# Patient Record
Sex: FEMALE
[De-identification: names, ages, dates, MRNs, and addresses within clinical notes are randomized per-mention and may not be internally consistent; named-entity substitution may affect disease eponyms.]

---

## 2019-02-11 ENCOUNTER — HOSPITAL ENCOUNTER (INPATIENT)
Dept: HOSPITAL 90 - EDH | Age: 54
LOS: 5 days | Discharge: HOME | End: 2019-02-16
Payer: SELF-PAY

## 2019-02-11 DIAGNOSIS — E11.10: Primary | ICD-10-CM

## 2019-02-11 DIAGNOSIS — E87.6: ICD-10-CM

## 2019-02-11 DIAGNOSIS — E83.42: ICD-10-CM

## 2019-02-11 DIAGNOSIS — N17.9: ICD-10-CM

## 2019-02-11 DIAGNOSIS — E87.1: ICD-10-CM

## 2025-03-28 ENCOUNTER — HOSPITAL ENCOUNTER (EMERGENCY)
Dept: HOSPITAL 90 - EDH | Age: 60
Discharge: HOME | End: 2025-03-28
Payer: COMMERCIAL

## 2025-03-28 VITALS
DIASTOLIC BLOOD PRESSURE: 80 MMHG | HEART RATE: 80 BPM | OXYGEN SATURATION: 97 % | TEMPERATURE: 98.1 F | SYSTOLIC BLOOD PRESSURE: 170 MMHG | RESPIRATION RATE: 16 BRPM

## 2025-03-28 VITALS — HEIGHT: 62 IN | BODY MASS INDEX: 29.45 KG/M2 | WEIGHT: 160.06 LBS

## 2025-03-28 DIAGNOSIS — H53.8: Primary | ICD-10-CM

## 2025-03-28 DIAGNOSIS — Z79.84: ICD-10-CM

## 2025-03-28 PROCEDURE — 99284 EMERGENCY DEPT VISIT MOD MDM: CPT

## 2025-03-28 PROCEDURE — 70450 CT HEAD/BRAIN W/O DYE: CPT

## 2025-03-28 NOTE — HMCIMG
11/21/22      Yaneli Bernal  [address] [address]      Thank you for talking with me on the phone on Friday 11/18/22. My name is Lorrie Diaz RN and I’m a care manager with [facility].     Care Management is designed to help you get the best health care there is for your health issues. Taking part in Care Management is a free, confidential service that helps you and/or your family with your healthcare needs. When we spoke, I told you that I called you because your illness or healthcare needs may be complex or challenging.     As your care manager, I work with you and Maria R Arellano MD to get the most helpful care for your health. My job is to:     · Help you understand the type of care you need.   · Educate you about your health care and/or choices.  · Make sure you know where you can find the health care you need.  · Help you/your family find support to meet your healthcare needs.   · Keep your doctor apprised of your treatment and your needs.   · Help you with maximizing your health care benefits.     If you have any questions or if I can help you in any way, please call me at     [phone]   9:00 AM to 5:00 PM CST  (Monday-Friday)     If I’m not able to take your call, you can leave a private message and I will call you back within one business day. When I’m working to arrange your health care and benefits, I may talk to your doctors and your health plan about your benefits. But don’t worry, I take your privacy very seriously. That means I won’t ever share any personal information with your family, friends or anyone else unless you say it’s okay. You do have the right not to take part in care management. Simply inform me at any time that you do not want my help.     I look forward to working with you.      Sincerely,    Lorrie Diaz RN  Outpatient?Care?Manager?   [facility]      Transportation Resources:   [facility]   [employer]  CT HEAD/BRAIN W/O CONTRAST



HISTORY: blurred vision



COMPARISON: None



TECHNIQUE: Multiple sequential axial images of the head were obtained

from the base of the skull through vertex. Patient was not given

contrast through intravenous route. 



FINDINGS: The ventricles and extraventricular CSF spaces are nondilated

for patient's age.  There is no midline shift, mass effect or

herniation.  No acute intracranial bleed is seen.  Visualized portion of

the paranasal sinuses are grossly within normal limits.



IMPRESSION: 

1. No acute intracranial bleed is seen.









CT was performed with one or more following dose reduction techniques:

automated exposure control, adjustment of the mA and kv according to

patient's size, or use of a iterative reconstruction technique. - [employer] ([employer])    [address] [place] [address]    Phone: [phone]    Website: http://www.[employer]spap.com/    Resource for: Transportation Needs    Languages: English, Peruvian    Cost: Unsubsidized    Hours of Operation     Sunday --     Monday  8:00 AM -  5:00 PM     Tuesday  8:00 AM -  5:00 PM     Wednesday  8:00 AM -  5:00 PM     Thursday  8:00 AM -  5:00 PM     Friday  8:00 AM -  5:00 PM     Saturday --   [facility] [address] [address]    Phone: [phone]    Website: [email or web address].org/    Languages: English, Peruvian    Cost: Free    Hours of Operation     Sunday --     Monday --     Tuesday --     Wednesday 10:30 AM - 11:30 AM     Thursday --     Friday --     Saturday --     [employer]:  [phone]   Call 3 business days in advance to schedule ride to and from a medical appointment

## 2025-03-28 NOTE — ERN
ED Note


History of Present Illness


Stated Complaint:  BLURRED VISION


Chief Complaint:  Blurred/Double Vision


Time Seen by MD:  12:45


Dictation:


PATIENT IS A 59-YEAR-OLD FEMALE COMING IN TODAY WITH COMPLAINTS OF BLURRED 

VISION TO THE RIGHT EYE ONSET LAST NIGHT.  SHE STATES SHE WAS LEANING OVER 

PICKING UP FURNITURE AS THEIR HOUSE WAS FLOODING, SHE FELT PRESSURE TO HER RIGHT

EYE .  SHE STATES SHE HAS A HISTORY OF MACULAR EDEMA AND DEGENERATION PATIENT OF

 IN [facility] HOWEVER SHE NO LONGER TAKES HER INSURANCE.  DENIES 

ANY HEADACHE.  EOMS ARE INTACT NO CORRECTIVE LENSES AT THIS TIME.


Allergies:  


Coded Allergies:  


     No Allergy Information Available (Verified  Allergy, Unknown, 2/12/19)


     No Known Drug Allergies (Unverified  Allergy, Unknown, 2/13/19)


Home Meds


Active Scripts


Metformin HCl (Metformin HCl) 500 Mg Tablet, 500 MG PO BID for 10 Days, #20 TAB 

0 Refills


   Prov:[employer]         2/16/19





Past Medical History


RN Note Reviewed/Agreed w/[facility]:  Yes





Review of System


Dictation


CONSTITUTIONAL:  Negative except for HPI


HEAD/FACE:  Negative except for HPI


EENT:  Negative except for HPI blurred vision right


RESPIRATORY: Negative except for HPI


GASTROINTESTINAL/ABDOMINAL:   Negative except for HPI


GENITOURINARY: Negative except for HPI


MUSCULOSKELETAL: Negative except for HPI


INTEGUMENTARY:  Negative except for HPI


NEUROLOGICAL/PSYCH: Negative except for HPI


HEMATOLOGIC/LYMPHATIC:  Negative except for HPI





All Systems Negative, Except as noted above.





13 point review of systems assessed and all negative except for above.





Initial Vital Sign


VS





                                   Vital Signs








  Date Time  Temp Pulse Resp B/P (MAP) Pulse Ox O2 Delivery O2 Flow Rate FiO2


 


3/28/25 12:41 97.5 88 17 158/92 97 Room Air 0 


 


3/28/25 12:45        21











Physical Exam


Dictation


Vital Signs reviewed 


General Appearance: Alert, oriented x 3, no acute distress, well developed, 

nourished. 


Head and Face: non-traumatic.


Eyes: PERRL, pink conjunctivas, eyelid no trauma, anterior chamber with arcus 

senilis.  EOMs intact no visual field cuts right


Ears: Pinnas intact and no signs of trauma or erythema ear canals clear and no 

discharge TM no erythema 


Nose: No discharge, no bleeding. 


Oropharynx: Mouth normal, tongue pink, 


pharynx clear,no erythema, tonsils no exudates, no abscesses noted,  mucous 

membrane moist 


Neck: Supple, non-tender, no thyromegaly, no masses, no JVD, no bruits 


Breast:Deferred 


Chest:No tenderness, no crepitus, no paradoxical movement, no retractions 


Lungs:Clear, well-ventilated, symmetric, no rales, no wheezing, no rhonchi, no 

stridor, good breath sounds bilaterally 


Heart: Regular rate, regular rhythm, no murmur, no gallops 


Vascular: no peripheral edema, 


Abdomen: Soft, positive bowel sounds, nondistended, no guarding, 


nontender, no rebound, no masses no hepatomegaly, no splenomegaly, no Gerard's 

sign, no hernias.


Rectal: Deferred


Genital: Deferred


Neurological: Normal speech,  motor function intact, sensory function intact 


Musculoskeletal: Neck nontender, full range of motion, back nontender, full 

range of motion,


Extremities: nontender, full range of motion 


Skin: Color pink, dry, no turgor, no rash, no lacerations, no abrasions, no 

contusions.


Lymphatic: Deferred





Results (Laboratory/Radiology)


Laboratory/Radiology


 


CT HEAD/BRAIN W/O CONTRAST  


 


HISTORY: blurred vision  


 


COMPARISON: None  


 


TECHNIQUE: Multiple sequential axial images of the head were obtained  


from the base of the skull through vertex. Patient was not given  


contrast through intravenous route.   


 


FINDINGS: The ventricles and extraventricular CSF spaces are nondilated  


for patient's age.  There is no midline shift, mass effect or  


herniation.  No acute intracranial bleed is seen.  Visualized portion of  


the paranasal sinuses are grossly within normal limits.  


 


IMPRESSION:   


1. No acute intracranial bleed is seen.  


 


 





Ultrasound retina demonstrates no detached


Labs Reviewed?:  Yes





ED Course


ED Course





Orders








Procedure Category Date Status





  Time 


 


Visual Acuity Test CPOE 3/28/25 Transmitted





 (Er)  12:45 


 


Ct Head/Brain W/O CT 3/28/25 Resulted





Contrast  12:45 


 


*Nursing CPOE 3/28/25 Transmitted





Communication:  14:01 








Vital Signs








  Date Time  Temp Pulse Resp B/P (MAP) Pulse Ox O2 Delivery O2 Flow Rate FiO2


 


3/28/25 12:45 98.1 89 16 159/87 98 Room Air* 0 21


 


3/28/25 12:41 97.5 88 17 158/92 97 Room Air 0 











1400/tonomter reading 16





Ultrasound the retina negative for detachmentre  at bedside





Medical Decision Making


MDM


Medical decision-making based on CT and ultrasound


Of retina.





Both negative patient states she has a information for [facility] 

and we will call them today.





DX & DISP


Disposition:  Discharge


Departure


Impression:  


   Primary Impression:  Blurred vision, right eye


Condition:  Stable


Referrals:  


GÓMEZ PAYAN MD (PCP)


Time of Disposition:  14:30


I have reviewed the case, and I agree with, Diagnosis and Plan











ELLIS MARTINEZ NP              Mar 28, 2025 12:47